# Patient Record
Sex: MALE | Race: WHITE
[De-identification: names, ages, dates, MRNs, and addresses within clinical notes are randomized per-mention and may not be internally consistent; named-entity substitution may affect disease eponyms.]

---

## 2018-12-05 ENCOUNTER — HOSPITAL ENCOUNTER (EMERGENCY)
Dept: HOSPITAL 75 - ER | Age: 38
Discharge: HOME | End: 2018-12-05
Payer: COMMERCIAL

## 2018-12-05 VITALS — SYSTOLIC BLOOD PRESSURE: 132 MMHG | DIASTOLIC BLOOD PRESSURE: 90 MMHG

## 2018-12-05 VITALS — WEIGHT: 190 LBS | BODY MASS INDEX: 25.73 KG/M2 | HEIGHT: 72 IN

## 2018-12-05 DIAGNOSIS — R10.13: Primary | ICD-10-CM

## 2018-12-05 DIAGNOSIS — R11.2: ICD-10-CM

## 2018-12-05 LAB
ALBUMIN SERPL-MCNC: 4.6 GM/DL (ref 3.2–4.5)
ALP SERPL-CCNC: 65 U/L (ref 40–136)
ALT SERPL-CCNC: 50 U/L (ref 0–55)
APTT PPP: YELLOW S
BACTERIA #/AREA URNS HPF: NEGATIVE /HPF
BASOPHILS # BLD AUTO: 0.1 10^3/UL (ref 0–0.1)
BASOPHILS NFR BLD AUTO: 1 % (ref 0–10)
BILIRUB SERPL-MCNC: 0.9 MG/DL (ref 0.1–1)
BILIRUB UR QL STRIP: NEGATIVE
BUN/CREAT SERPL: 7
CALCIUM SERPL-MCNC: 10 MG/DL (ref 8.5–10.1)
CHLORIDE SERPL-SCNC: 102 MMOL/L (ref 98–107)
CO2 SERPL-SCNC: 26 MMOL/L (ref 21–32)
CREAT SERPL-MCNC: 1.28 MG/DL (ref 0.6–1.3)
EOSINOPHIL # BLD AUTO: 0.2 10^3/UL (ref 0–0.3)
EOSINOPHIL NFR BLD AUTO: 1 % (ref 0–10)
ERYTHROCYTE [DISTWIDTH] IN BLOOD BY AUTOMATED COUNT: 12.7 % (ref 10–14.5)
FIBRINOGEN PPP-MCNC: CLEAR MG/DL
GFR SERPLBLD BASED ON 1.73 SQ M-ARVRAT: > 60 ML/MIN
GLUCOSE SERPL-MCNC: 91 MG/DL (ref 70–105)
GLUCOSE UR STRIP-MCNC: NEGATIVE MG/DL
HCT VFR BLD CALC: 46 % (ref 40–54)
HGB BLD-MCNC: 15.8 G/DL (ref 13.3–17.7)
KETONES UR QL STRIP: NEGATIVE
LEUKOCYTE ESTERASE UR QL STRIP: NEGATIVE
LIPASE SERPL-CCNC: 15 U/L (ref 8–78)
LYMPHOCYTES # BLD AUTO: 3.5 X 10^3 (ref 1–4)
LYMPHOCYTES NFR BLD AUTO: 26 % (ref 12–44)
MANUAL DIFFERENTIAL PERFORMED BLD QL: NO
MCH RBC QN AUTO: 29 PG (ref 25–34)
MCHC RBC AUTO-ENTMCNC: 35 G/DL (ref 32–36)
MCV RBC AUTO: 85 FL (ref 80–99)
MONOCYTES # BLD AUTO: 1 X 10^3 (ref 0–1)
MONOCYTES NFR BLD AUTO: 8 % (ref 0–12)
NEUTROPHILS # BLD AUTO: 8.3 X 10^3 (ref 1.8–7.8)
NEUTROPHILS NFR BLD AUTO: 64 % (ref 42–75)
NITRITE UR QL STRIP: NEGATIVE
PH UR STRIP: 8 [PH] (ref 5–9)
PLATELET # BLD: 288 10^3/UL (ref 130–400)
PMV BLD AUTO: 10.5 FL (ref 7.4–10.4)
POTASSIUM SERPL-SCNC: 4.1 MMOL/L (ref 3.6–5)
PROT SERPL-MCNC: 7.6 GM/DL (ref 6.4–8.2)
PROT UR QL STRIP: NEGATIVE
RBC # BLD AUTO: 5.38 10^6/UL (ref 4.35–5.85)
RBC #/AREA URNS HPF: (no result) /HPF
SODIUM SERPL-SCNC: 138 MMOL/L (ref 135–145)
SP GR UR STRIP: 1.01 (ref 1.02–1.02)
SQUAMOUS #/AREA URNS HPF: (no result) /HPF
UROBILINOGEN UR-MCNC: NORMAL MG/DL
WBC # BLD AUTO: 13.1 10^3/UL (ref 4.3–11)
WBC #/AREA URNS HPF: (no result) /HPF

## 2018-12-05 PROCEDURE — 76705 ECHO EXAM OF ABDOMEN: CPT

## 2018-12-05 PROCEDURE — 36415 COLL VENOUS BLD VENIPUNCTURE: CPT

## 2018-12-05 PROCEDURE — 74177 CT ABD & PELVIS W/CONTRAST: CPT

## 2018-12-05 PROCEDURE — 83690 ASSAY OF LIPASE: CPT

## 2018-12-05 PROCEDURE — 80053 COMPREHEN METABOLIC PANEL: CPT

## 2018-12-05 PROCEDURE — 81000 URINALYSIS NONAUTO W/SCOPE: CPT

## 2018-12-05 PROCEDURE — 85025 COMPLETE CBC W/AUTO DIFF WBC: CPT

## 2018-12-05 NOTE — DIAGNOSTIC IMAGING REPORT
PROCEDURE: US Gallbladder.



TECHNIQUE: Multiple real-time grayscale images were obtained over

the right upper quadrant in various projections.



INDICATION:  Abdominal pain.



FINDINGS: The liver is normal in size at 15.5 cm. No discrete

liver mass is identified. The portal vein is patent and shows

normal direction of flow. Gallbladder is without stones or

sludge. No wall thickening or biliary ductal dilatation is seen.

Pancreas is obscured by bowel gas. The right kidney is

unremarkable. There is no ascites.



IMPRESSION: Unremarkable gallbladder ultrasound.



Dictated by: 



  Dictated on workstation # [id number]

## 2018-12-05 NOTE — ED ABDOMINAL PAIN
General


Chief Complaint:  Abdominal/GI Problems


Stated Complaint:  ABD PAIN


Nursing Triage Note:  


Pt c/o mid abd pain starting Sunday  night 12/2. Pt reports pain returned at 


1800 last night. Pt reports vomiting.


Sepsis Screen:  No Definite Risk





Allergies and Home Medications


Allergies


Uncoded Allergies:  


     NUTS (Allergy, Unknown, 12/5/18)





Past Medical-Social-Family Hx


Patient Social History


Alcohol Use:  Regular Use


Number of Drinks Today:  0


Alcohol Beverage of Choice:  Beer


Recreational Drug Use:  No


Type Used:  Smokeless Tobacco


Recent Foreign Travel:  No


Contact w/Someone Who Travel:  No


Recent Infectious Disease Expo:  No


Recent Hopitalizations:  No (DENIES HX)





Physical Exam


Vital Signs





Vital Signs - First Documented








 12/5/18





 08:14


 


Temp 98.0


 


Pulse 63


 


Resp 18


 


B/P (MAP) 146/95 (112)


 


Pulse Ox 100


 


O2 Delivery Room Air





Capillary Refill : Less Than 3 Seconds


Height/Weight/BMI


Height: 6'0"


Weight: 190lbs. oz. 86.071419tv;  BMI


Method:Estimated





Progress/Results/Core Measures


Results/Orders


Lab Results





Laboratory Tests








Test


 12/5/18


08:40 12/5/18


10:40 Range/Units


 


 


White Blood Count


 13.1 H


 


 4.3-11.0


10^3/uL


 


Red Blood Count


 5.38 


 


 4.35-5.85


10^6/uL


 


Hemoglobin 15.8   13.3-17.7  G/DL


 


Hematocrit 46   40-54  %


 


Mean Corpuscular Volume 85   80-99  FL


 


Mean Corpuscular Hemoglobin 29   25-34  PG


 


Mean Corpuscular Hemoglobin


Concent 35 


 


 32-36  G/DL





 


Red Cell Distribution Width 12.7   10.0-14.5  %


 


Platelet Count


 288 


 


 130-400


10^3/uL


 


Mean Platelet Volume 10.5 H  7.4-10.4  FL


 


Neutrophils (%) (Auto) 64   42-75  %


 


Lymphocytes (%) (Auto) 26   12-44  %


 


Monocytes (%) (Auto) 8   0-12  %


 


Eosinophils (%) (Auto) 1   0-10  %


 


Basophils (%) (Auto) 1   0-10  %


 


Neutrophils # (Auto) 8.3 H  1.8-7.8  X 10^3


 


Lymphocytes # (Auto) 3.5   1.0-4.0  X 10^3


 


Monocytes # (Auto) 1.0   0.0-1.0  X 10^3


 


Eosinophils # (Auto)


 0.2 


 


 0.0-0.3


10^3/uL


 


Basophils # (Auto)


 0.1 


 


 0.0-0.1


10^3/uL


 


Sodium Level 138   135-145  MMOL/L


 


Potassium Level 4.1   3.6-5.0  MMOL/L


 


Chloride Level 102     MMOL/L


 


Carbon Dioxide Level 26   21-32  MMOL/L


 


Anion Gap 10   5-14  MMOL/L


 


Blood Urea Nitrogen 9   7-18  MG/DL


 


Creatinine


 1.28 


 


 0.60-1.30


MG/DL


 


Estimat Glomerular Filtration


Rate > 60 


 


  





 


BUN/Creatinine Ratio 7    


 


Glucose Level 91     MG/DL


 


Calcium Level 10.0   8.5-10.1  MG/DL


 


Corrected Calcium    8.5-10.1  MG/DL


 


Total Bilirubin 0.9   0.1-1.0  MG/DL


 


Aspartate Amino Transf


(AST/SGOT) 29 


 


 5-34  U/L





 


Alanine Aminotransferase


(ALT/SGPT) 50 


 


 0-55  U/L





 


Alkaline Phosphatase 65     U/L


 


Total Protein 7.6   6.4-8.2  GM/DL


 


Albumin 4.6 H  3.2-4.5  GM/DL


 


Lipase 15   8-78  U/L


 


Urine Color  YELLOW   


 


Urine Clarity  CLEAR   


 


Urine pH  8  5-9  


 


Urine Specific Gravity  1.010 L 1.016-1.022  


 


Urine Protein  NEGATIVE  NEGATIVE  


 


Urine Glucose (UA)  NEGATIVE  NEGATIVE  


 


Urine Ketones  NEGATIVE  NEGATIVE  


 


Urine Nitrite  NEGATIVE  NEGATIVE  


 


Urine Bilirubin  NEGATIVE  NEGATIVE  


 


Urine Urobilinogen  NORMAL  NORMAL  MG/DL


 


Urine Leukocyte Esterase  NEGATIVE  NEGATIVE  


 


Urine RBC (Auto)  NEGATIVE  NEGATIVE  


 


Urine RBC  NONE   /HPF


 


Urine WBC  RARE   /HPF


 


Urine Squamous Epithelial


Cells 


 RARE 


  /HPF





 


Urine Crystals  NONE   /LPF


 


Urine Bacteria  NEGATIVE   /HPF


 


Urine Casts  NONE   /LPF


 


Urine Mucus  NEGATIVE   /LPF


 


Urine Culture Indicated  NO   








My Orders





Orders - HILARIO ROSARIO MD


Ua Culture If Indicated (12/5/18 07:24)


Ondansetron Injection (Zofran Injectio (12/5/18 08:42)


Cbc With Automated Diff (12/5/18 08:46)


Comprehensive Metabolic Panel (12/5/18 08:46)


Lipase (12/5/18 08:46)


Saline Lock/Iv-Start (12/5/18 08:46)


Ns Iv 1000 Ml (Sodium Chloride 0.9%) (12/5/18 08:46)


Fentanyl  Injection (Sublimaze Injection (12/5/18 09:00)


Famotidine Injection (Pepcid Injection) (12/5/18 09:00)


Ct Abd/Pelv W (Appendicitis) (12/5/18 08:53)


Iohexol Injection (Omnipaque 350 Mg/Ml 1 (12/5/18 09:15)


Contrast Received (Contrast Received) (12/5/18 09:15)


Ns (Ivpb) (Sodium Chloride 0.9%) (12/5/18 09:15)


Us Gallbladder 64072 (12/5/18 11:24)


Lidocaine 2% Viscous 15 Ml (Xylocaine Vi (12/5/18 12:45)


Antacid  Suspension (Mylanta  Suspension (12/5/18 12:45)


Antacid  Suspension (Mylanta  Suspension (12/5/18 12:34)


Lidocaine 2% Viscous 15 Ml (Xylocaine Vi (12/5/18 12:34)





Medications Given in ED





Current Medications








 Medications  Dose


 Ordered  Sig/Celine


 Route  Start Time


 Stop Time Status Last Admin


Dose Admin


 


 Al Hydrox/Mg


 Hydrox/Simethicone  30 ml  ONCE  ONCE


 PO  12/5/18 12:45


 12/5/18 12:46 DC 12/5/18 12:40


30 ML


 


 Famotidine  20 mg  ONCE  ONCE


 IVP  12/5/18 09:00


 12/5/18 09:01 DC 12/5/18 09:15


20 MG


 


 Fentanyl Citrate  50 mcg  ONCE  ONCE


 IVP  12/5/18 09:00


 12/5/18 09:01 DC 12/5/18 09:15


50 MCG


 


 Iohexol  100 ml  ONCE  ONCE


 IV  12/5/18 09:15


 12/5/18 09:33 DC 12/5/18 09:44


100 ML


 


 Lidocaine HCl  15 ml  ONCE  ONCE


 PO  12/5/18 12:45


 12/5/18 12:46 DC 12/5/18 12:39


15 ML


 


 Ondansetron HCl  4 mg  STK-MED ONCE


 .ROUTE  12/5/18 08:42


 12/5/18 08:45 DC 12/5/18 08:45


8 MG


 


 Sodium Chloride  250 ml  ONCE  ONCE


 IV  12/5/18 09:15


 12/5/18 09:33 DC 12/5/18 09:44


80 ML


 


 Sodium Chloride  1,000 ml @ 


 0 mls/hr  Q0M ONCE


 IV  12/5/18 08:46


 12/5/18 08:49 DC 12/5/18 09:10


1,000 MLS/HR








Vital Signs/I&O











 12/5/18





 08:14


 


Temp 98.0


 


Pulse 63


 


Resp 18


 


B/P (MAP) 146/95 (112)


 


Pulse Ox 100


 


O2 Delivery Room Air














Blood Pressure Mean:  112











Departure


Impression





 Primary Impression:  


 Epigastric pain


 Additional Impression:  


 Nausea and vomiting


 Qualified Codes:  R11.2 - Nausea with vomiting, unspecified


Disposition:  01 HOME, SELF-CARE


Condition:  Improved





Departure-Patient Inst.


Decision time for Depature:  12:57


Referrals:  


UNKNOWN (PCP)


Primary Care Physician


Patient Instructions:  Acute Abdomen (Belly Pain), Adult (DC), Gastritis (DC)





Add. Discharge Instructions:  


Start with a clear liquid diet today and gradually advance your diet with small 

quantities of bland food as tolerated.


Take an antacid such as omeprazole daily for at least the next month.


You may use Zofran (ondansetron) as prescribed for nausea and vomiting.


Avoid the following: Eating large meals, eating close to bedtime, alcohol, 

tobacco products, caffeine, carbonation, citrus fruits and juices, tomato 

products, chocolate, mints, NSAID medications such as ibuprofen or naproxen, 

fatty or greasy foods, or anything else you know irritates your stomach.


Follow-up with your primary care provider within the next few weeks.


Return to care more promptly if you have worsening symptoms.














All discharge instructions reviewed with patient and/or family. Voiced 

understanding.


Scripts


Ondansetron (Ondansetron Odt) 4 Mg Tab.rapdis


4 MG SL Q4H, #10 TAB


   Prov: HILARIO ROSARIO MD         12/5/18 


Omeprazole (Omeprazole) 20 Mg Tablet.


20 MG PO BID, #60 TAB


   Prov: HILARIO ROSARIO MD         12/5/18











HILARIO ROSARIO MD Dec 5, 2018 13:00

## 2018-12-05 NOTE — DIAGNOSTIC IMAGING REPORT
PROCEDURE: CT abdomen and pelvis with contrast, rule out

appendicitis.



TECHNIQUE: Multiple contiguous axial images were obtained through

the abdomen and pelvis after the administration of intravenous

contrast.



INDICATION: Mid abdominal pain.



No prior studies are available for comparison.



The lung bases are clear. There is a low-density lesion right

lobe of the liver measuring 17 mm in size. This appears to be

isodense to liver on delayed images and may represent a

hemangioma. Gallbladder is unremarkable. No biliary ductal

dilatation is seen. The pancreas and spleen are unremarkable. No

adrenal mass is identified. The kidneys are unremarkable. The

aorta is nonaneurysmal. The small and large bowel loops are

normal caliber. No obstruction is seen. The appendix is

visualized and has a normal appearance. No definite CT findings

to suggest acute appendicitis are identified. There is no free

fluid in the abdomen or pelvis. Prostate is unremarkable.



IMPRESSION: Unremarkable CT of the abdomen and pelvis. There are

no findings to suggest acute appendicitis.











Dictated by: 



  Dictated on workstation # [id number]